# Patient Record
Sex: FEMALE | Race: WHITE | Employment: FULL TIME | URBAN - METROPOLITAN AREA
[De-identification: names, ages, dates, MRNs, and addresses within clinical notes are randomized per-mention and may not be internally consistent; named-entity substitution may affect disease eponyms.]

---

## 2023-06-21 ENCOUNTER — OFFICE VISIT (OUTPATIENT)
Dept: URGENT CARE | Age: 48
End: 2023-06-21

## 2023-06-21 VITALS
TEMPERATURE: 98.3 F | OXYGEN SATURATION: 96 % | SYSTOLIC BLOOD PRESSURE: 167 MMHG | DIASTOLIC BLOOD PRESSURE: 107 MMHG | WEIGHT: 160 LBS | HEART RATE: 79 BPM | RESPIRATION RATE: 16 BRPM | BODY MASS INDEX: 30.21 KG/M2 | HEIGHT: 61 IN

## 2023-06-21 DIAGNOSIS — H10.32 ACUTE BACTERIAL CONJUNCTIVITIS OF LEFT EYE: Primary | ICD-10-CM

## 2023-06-21 DIAGNOSIS — I10 ESSENTIAL HYPERTENSION: ICD-10-CM

## 2023-06-21 RX ORDER — ERYTHROMYCIN 5 MG/G
OINTMENT OPHTHALMIC EVERY 6 HOURS
Qty: 3.5 G | Refills: 0 | Status: SHIPPED | OUTPATIENT
Start: 2023-06-21 | End: 2023-07-01

## 2023-06-21 RX ORDER — LOSARTAN POTASSIUM 100 MG/1
1 TABLET ORAL DAILY
COMMUNITY
Start: 2014-04-28

## 2023-06-21 RX ORDER — ERGOCALCIFEROL 1.25 MG/1
50000 CAPSULE ORAL WEEKLY
COMMUNITY

## 2023-06-21 RX ORDER — CARVEDILOL 25 MG/1
TABLET ORAL
COMMUNITY
Start: 2014-05-13

## 2023-06-21 ASSESSMENT — ENCOUNTER SYMPTOMS
EYE PAIN: 1
EYE REDNESS: 1
EYE ITCHING: 0
EYE DISCHARGE: 1
PHOTOPHOBIA: 1

## 2023-06-21 NOTE — PATIENT INSTRUCTIONS
Erythromycin ointment as prescribed  Avoid use of contact lenses until bacteria is resolved   Monitor BP at home and call PCP if persistently elevated. Please contact [facility] in am for eye exam      Please follow up with: [facility] is open Monday through Friday from 8:30 a.m. to 5:00 p.m. and on Saturdays from 8:30 a.m. to 12:00 p.m. and is located at [address] [place] [address]. Appointments are needed. A I ophthalmologist is on call 24 hours a day, seven days a week. Call [phone] to make an appointment.

## 2023-06-21 NOTE — PROGRESS NOTES
Fareed Mane (:  1975) is a 53 y.o. female,New patient, here for evaluation of the following chief complaint(s):  Eye Problem (Started yesterday morning, drainage and redness, light sensitive, swelling )      ASSESSMENT/PLAN:    ICD-10-CM    1. Acute bacterial conjunctivitis of left eye  H10.32 erythromycin (ROMYCIN) 5 MG/GM ophthalmic ointment     fluorescein ophthalmic strip 1 mg      2. Essential hypertension  I10           Erythromycin ointment as prescribed  Avoid use of contact lenses until bacteria is resolved   Monitor BP at home and call PCP if persistently elevated. Please contact [facility] in am for eye exam    Patient presents with redness, swelling and yellow drainage from left eye. No known injury. Tetracaine was unavailable in clinic today. Fluorescein stain was negative for abrasion. She was treated with erythromycin ointment. Follow up with [facility] was recommended on  due to significant swelling and photophobia. SUBJECTIVE/OBJECTIVE:    History provided by:  Patient   used: No    HPI:   53 y.o. female presents with symptoms of left eye clear drainage, redness, and swelling ongoing since 2023. Is having light sensitivity. Denies blurry vision or known injury/foreign object. Denies known pink eye exposure. Has taken/used ibuprofen and she started Polytrim drops on  for symptoms without much relief. She reports she has white coat HTN and monitors BP home. Is followed by cardiology. Vitals:    23 1901 23 1902 23 1923   BP: (!) 176/122 (!) 169/114 (!) 167/107   Site: Right Upper Arm Left Upper Arm    Position: Sitting Sitting    Cuff Size: Medium Adult Large Adult    Pulse: 79     Resp: 16     Temp: 98.3 °F (36.8 °C)     SpO2: 96%     Weight: 160 lb (72.6 kg)     Height: 5' 1\" (1.549 m)         Review of Systems   Constitutional:  Negative for fever. Eyes:  Positive for photophobia, pain, discharge (clear) and redness.  Negative for

## 2023-09-17 ENCOUNTER — E-VISIT (OUTPATIENT)
Dept: PRIMARY CARE CLINIC | Age: 48
End: 2023-09-17
Payer: COMMERCIAL

## 2023-09-17 DIAGNOSIS — J06.9 UPPER RESPIRATORY TRACT INFECTION, UNSPECIFIED TYPE: Primary | ICD-10-CM

## 2023-09-17 PROCEDURE — 99422 OL DIG E/M SVC 11-20 MIN: CPT | Performed by: NURSE PRACTITIONER

## 2023-09-17 RX ORDER — METHYLPREDNISOLONE 4 MG/1
TABLET ORAL
Qty: 1 KIT | Refills: 0 | Status: SHIPPED | OUTPATIENT
Start: 2023-09-17 | End: 2023-09-23

## 2023-09-17 RX ORDER — LORATADINE 10 MG/1
10 TABLET ORAL DAILY
Qty: 30 TABLET | Refills: 0 | Status: SHIPPED | OUTPATIENT
Start: 2023-09-17 | End: 2023-10-17

## 2023-09-17 ASSESSMENT — LIFESTYLE VARIABLES: SMOKING_STATUS: NO, I'VE NEVER SMOKED
